# Patient Record
(demographics unavailable — no encounter records)

---

## 2025-02-25 NOTE — DATA REVIEWED
[FreeTextEntry1] :  office xrays of right shoulder ap and outlet show type 2 acromion, mild GH oa inferiorly, mild AC joint oa.

## 2025-02-25 NOTE — ASSESSMENT
[FreeTextEntry1] : MRI left hip shows mild hip oa and strain of adductor.   Discussed diagnosis and treatment plan including PT. Can swim as tolerated but no jumping in pool. Bike as tolerated ice area often Consider hip injection but she does not want it yet do groin stretch only take 1 nsaid  shoulder - Discussed diagnosis and treatment plan including PT. getting EMG soon for RUE in the [place]  f/u 1 month

## 2025-02-25 NOTE — PHYSICAL EXAM
[FreeTextEntry1] : NURIS is a 67 year old female Constitutional: healthy appearing, NAD, and overweight  Gait: slightly antalgic, has a cane, tilting side to side a bit  Inspection: no erythema, warmth 5 HF sensation intact in bilat LE  Special tests: neg seated SLR +mild FADIR in left hip  right shoulder: abd to 90 deg right wrist in brace difficulty closing hand
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
